# Patient Record
Sex: MALE | URBAN - METROPOLITAN AREA
[De-identification: names, ages, dates, MRNs, and addresses within clinical notes are randomized per-mention and may not be internally consistent; named-entity substitution may affect disease eponyms.]

---

## 2018-05-07 ENCOUNTER — HOSPITAL ENCOUNTER (OUTPATIENT)
Age: 58
Setting detail: SPECIMEN
Discharge: HOME | End: 2018-05-07
Payer: COMMERCIAL

## 2018-05-11 LAB — DERMATOLOGY PATHOLOGY REPORT: NORMAL

## 2024-07-08 ENCOUNTER — HOSPITAL ENCOUNTER (OUTPATIENT)
Age: 64
Setting detail: SPECIMEN
Discharge: HOME | End: 2024-07-08

## 2024-07-08 DIAGNOSIS — E53.8 VITAMIN B12 DEFICIENCY: ICD-10-CM

## 2024-07-08 DIAGNOSIS — R73.01 IFG (IMPAIRED FASTING GLUCOSE): ICD-10-CM

## 2024-07-08 DIAGNOSIS — E03.9 HYPOTHYROIDISM, UNSPECIFIED TYPE: ICD-10-CM

## 2024-07-08 DIAGNOSIS — E55.9 VITAMIN D DEFICIENCY: ICD-10-CM

## 2024-07-08 DIAGNOSIS — E78.2 MIXED HYPERLIPIDEMIA: ICD-10-CM

## 2024-07-08 DIAGNOSIS — R97.20 ABNORMAL PSA: ICD-10-CM

## 2024-07-08 LAB
25(OH)D3 SERPL-MCNC: 25 NG/ML (ref 30–100)
ALBUMIN SERPL-MCNC: 4.9 G/DL (ref 3.5–5.2)
ALBUMIN/GLOB SERPL: 2 {RATIO} (ref 1–2.5)
ALP SERPL-CCNC: 89 U/L (ref 40–129)
ALT SERPL-CCNC: 25 U/L (ref 10–50)
ANION GAP SERPL CALCULATED.3IONS-SCNC: 9 MMOL/L (ref 9–16)
AST SERPL-CCNC: 28 U/L (ref 10–50)
BASOPHILS # BLD: 0.04 K/UL (ref 0–0.2)
BASOPHILS NFR BLD: 1 % (ref 0–2)
BILIRUB SERPL-MCNC: 0.5 MG/DL (ref 0–1.2)
BUN SERPL-MCNC: 25 MG/DL (ref 8–23)
CALCIUM SERPL-MCNC: 9.7 MG/DL (ref 8.6–10.4)
CHLORIDE SERPL-SCNC: 102 MMOL/L (ref 98–107)
CHOLEST SERPL-MCNC: 156 MG/DL (ref 0–199)
CHOLESTEROL/HDL RATIO: 4
CO2 SERPL-SCNC: 30 MMOL/L (ref 20–31)
CREAT SERPL-MCNC: 1 MG/DL (ref 0.7–1.2)
EOSINOPHIL # BLD: 0.13 K/UL (ref 0–0.44)
EOSINOPHILS RELATIVE PERCENT: 2 % (ref 1–4)
ERYTHROCYTE [DISTWIDTH] IN BLOOD BY AUTOMATED COUNT: 12.4 % (ref 11.8–14.4)
GFR, ESTIMATED: 84 ML/MIN/1.73M2
GLUCOSE P FAST SERPL-MCNC: 90 MG/DL (ref 74–99)
HCT VFR BLD AUTO: 44.6 % (ref 40.7–50.3)
HDLC SERPL-MCNC: 44 MG/DL
HGB BLD-MCNC: 14.6 G/DL (ref 13–17)
IMM GRANULOCYTES # BLD AUTO: <0.03 K/UL (ref 0–0.3)
IMM GRANULOCYTES NFR BLD: 0 %
LDLC SERPL CALC-MCNC: 84 MG/DL (ref 0–100)
LYMPHOCYTES NFR BLD: 1.56 K/UL (ref 1.1–3.7)
LYMPHOCYTES RELATIVE PERCENT: 24 % (ref 24–43)
MCH RBC QN AUTO: 29.7 PG (ref 25.2–33.5)
MCHC RBC AUTO-ENTMCNC: 32.7 G/DL (ref 28.4–34.8)
MCV RBC AUTO: 90.7 FL (ref 82.6–102.9)
MONOCYTES NFR BLD: 0.67 K/UL (ref 0.1–1.2)
MONOCYTES NFR BLD: 10 % (ref 3–12)
NEUTROPHILS NFR BLD: 63 % (ref 36–65)
NEUTS SEG NFR BLD: 4.12 K/UL (ref 1.5–8.1)
NRBC BLD-RTO: 0 PER 100 WBC
PLATELET # BLD AUTO: 272 K/UL (ref 138–453)
PMV BLD AUTO: 10.3 FL (ref 8.1–13.5)
POTASSIUM SERPL-SCNC: 3.9 MMOL/L (ref 3.7–5.3)
PROT SERPL-MCNC: 7 G/DL (ref 6.6–8.7)
PSA SERPL-MCNC: 1.9 NG/ML (ref 0–4)
RBC # BLD AUTO: 4.92 M/UL (ref 4.21–5.77)
SODIUM SERPL-SCNC: 141 MMOL/L (ref 136–145)
T4 SERPL-MCNC: 5.5 UG/DL (ref 4.5–11.7)
TRIGL SERPL-MCNC: 142 MG/DL
TSH SERPL DL<=0.05 MIU/L-ACNC: 2.82 UIU/ML (ref 0.27–4.2)
VIT B12 SERPL-MCNC: 528 PG/ML (ref 232–1245)
VLDLC SERPL CALC-MCNC: 28 MG/DL
WBC OTHER # BLD: 6.5 K/UL (ref 3.5–11.3)

## 2024-08-02 ENCOUNTER — HOSPITAL ENCOUNTER (OUTPATIENT)
Dept: PHYSICAL THERAPY | Facility: CLINIC | Age: 64
Setting detail: THERAPIES SERIES
Discharge: HOME | End: 2024-08-02
Payer: COMMERCIAL

## 2024-08-02 PROCEDURE — 97161 PT EVAL LOW COMPLEX 20 MIN: CPT

## 2024-08-02 PROCEDURE — 97110 THERAPEUTIC EXERCISES: CPT

## 2024-08-02 NOTE — CONSULTS
[x] [facility]  Outpatient Rehabilitation &  Therapy  [address]   P: [phone]  F: [phone]      Physical Therapy Upper Extremity Evaluation    Date:  2024  Patient: Maciel Meier   : 1960  MRN: [id number]  Physician: Dr. Lim   Insurance: [employer] ($60 copay,  vs)  Medical Diagnosis: Left shoulder pain, unspecified chronicity (M25.512)  Numbness and tingling in left hand (R20.0,R20.2)  Rehab Codes: M62.81 , M25.612 , R29.3 , R20.2  Onset Date: referral date 24     Next 's appt: 24       Subjective:   CC/HPI: Patient is a 65 y/o male presents to PT clinic with numbness to his LUE intermittently that has been going on for 2 months. No known cause. He also reports feeling a knot in his left shoulder blade region that has been going on for about the same time. He plays tennis and reports noticing the pain during tennis intermittently. Sleeps in sidelying position, also notes numbness onset throughout the night, worse when laying on his left side. Sitting down worsens the numbness.       PMHx: [] Unremarkable [] Diabetes [x] HTN  [] Pacemaker   [] MI/Heart Problems [] Cancer [] Arthritis [] Other:              [x] Refer to full medical chart  In EPIC     Comorbidities:   [] Obesity [] Dialysis  [] N/A   [] Asthma/COPD [] Dementia [] Other:   [] Stroke [] Sleep apnea [] Other:   [] Vascular disease [] Rheumatic disease [] Other:     Tests:   [] X-Ray:   [] MRI:    [] Other:    Medications: [x] Refer to full medical record [] None [] Other:  Allergies:      [x] Refer to full medical record  [] None [] Other:    Function:  Hand Dominance  [x] Right  [] Left  Marital Status Lives with wife   Employement  with [employer]   Job status Full time    Work Activities/duties  Prolonged sitting, desk work, sometimes traveling   Recreational Activities Tennis (1-2x/week), golfing       ADL/IADL [x] Previously independent with all [x] Currently independent

## 2024-08-09 ENCOUNTER — HOSPITAL ENCOUNTER (OUTPATIENT)
Dept: PHYSICAL THERAPY | Facility: CLINIC | Age: 64
Setting detail: THERAPIES SERIES
Discharge: HOME | End: 2024-08-09
Payer: COMMERCIAL

## 2024-08-09 PROCEDURE — 97110 THERAPEUTIC EXERCISES: CPT

## 2024-08-09 PROCEDURE — 97140 MANUAL THERAPY 1/> REGIONS: CPT

## 2024-08-09 NOTE — FLOWSHEET NOTE
[] [facility]  Outpatient Rehabilitation &  Therapy  [address]  [phone]  [phone] [] [facility]  Outpatient Rehabilitation &  Therapy  [address]  P: [phone]  F: [phone] [] [facility]  Outpatient Rehabilitation &  Therapy  [address]  P: [phone]  F: [phone] [] [facility]  Outpatient Rehabilitation &  Therapy  [address]  [phone]  [phone] [x] [facility]  Outpatient Rehabilitation &  Therapy  [address]   P: [phone]  F: [phone]  [] [facility]  Outpatient Rehabilitation &  Therapy  [address]  P: [phone]  F: [phone] [] [facility]  Outpatient Rehabilitation &  Therapy  [address]  P: [phone]  F: [phone] [] [facility]  Outpatient Rehabilitation &  Therapy  [address]  P: [phone]  F: [phone] [] [facility]  Outpatient Rehabilitation &  Therapy  [address]  P: [phone]  F: [phone]  [] [facility] Outpatient Rehabilitation &  Therapy  [address]  P: [phone]  F: [phone]     Physical Therapy Daily Treatment Note    Date:  2024  Patient Name:  Maciel Meier    :  1960  MRN: [id number]  Physician: Dr. Lim                       Insurance: [employer] ($60 copay,  vs)  Medical Diagnosis: Left shoulder pain, unspecified chronicity (M25.512)  Numbness and tingling in left hand (R20.0,R20.2)  Rehab Codes: M62.81 , M25.612 , R29.3 , R20.2  Onset Date: referral date 24         Next 's appt: 24   Visit# / total visits:      Cancels/No Shows: 0    Subjective:    Pain:  [x] Yes  [] No Location:  N/A  Pain Rating: (0-10 scale) 3/10  Pain altered Tx:  [x] No  [] Yes  Action:  Comments: Patient reports

## 2024-08-16 ENCOUNTER — HOSPITAL ENCOUNTER (OUTPATIENT)
Dept: PHYSICAL THERAPY | Facility: CLINIC | Age: 64
Setting detail: THERAPIES SERIES
Discharge: HOME | End: 2024-08-16
Payer: COMMERCIAL

## 2024-08-16 PROCEDURE — 97140 MANUAL THERAPY 1/> REGIONS: CPT

## 2024-08-16 PROCEDURE — 97110 THERAPEUTIC EXERCISES: CPT

## 2024-08-16 NOTE — FLOWSHEET NOTE
[x] [facility]  Outpatient Rehabilitation &  Therapy  [address]   P: [phone]  F: [phone]      Physical Therapy Daily Treatment Note    Date:  2024  Patient Name:  Maciel Meier    :  1960  MRN: [id number]  Physician: Dr. Lim                       Insurance: [employer] ($60 copay,  vs)  Medical Diagnosis: Left shoulder pain, unspecified chronicity (M25.512)  Numbness and tingling in left hand (R20.0,R20.2)  Rehab Codes: M62.81 , M25.612 , R29.3 , R20.2  Onset Date: referral date 24         Next 's appt: 24   Visit# / total visits: 3/12     Cancels/No Shows: 0        Subjective:    Pain:  [x] Yes  [] No Location:  LUE shoulder  Pain Rating: (0-10 scale) 1/10  Pain altered Tx:  [x] No  [] Yes  Action:  Comments: Patient  with less overall pain in the shoulder, less numbness in the left hand overall. Feels some stiffness in the shoulder.       Objective:    Precautions: Standard   Exercise       LUE shoulder pain  Reps/ Time Weight/ Level Comments             Bike 10'                 Corner Pectoral Stretch  3x30\" ea    HEP   Scapular retraction       HEP   Upper Trap Stretch  2x30\"    HEP   Levator Scap Stretch  2x30\"    HEP                       Prone          Rows   2x10  3#    Ext  2x10  3#    HAB  2x10     Scaption  2x10                     Resistance Band:         Rows   2x10 Blue   HEP   Extension   2x10 Blue   HEP   ER  x20 Blue   HEP   IR   x20 Blue   HEP   HAB  x20 Blue         Somatic Dysfunctions Normal Deficit Details Treatment  Position Range of force   Thoracic    []  [x]  FRSL T4-T8   [] MET   [x] MOB   [] Manipulation [] Supine  [x] Prone  [] Seated  [] Beginning  [x] Middle  [x] End-point    Rib    []  [x]  L post rib 4-8th [] MET   [x] MOB   [] Manipulation [] Supine  [x] Prone  [] Seated  [] Beginning  [x] Middle  [x] End-point    FRS = flexed, rotated, side-bent  ERS = Extended, rotated, side-bent     NS = Neutral Spine MET = Muscle

## 2024-08-21 ENCOUNTER — HOSPITAL ENCOUNTER (OUTPATIENT)
Dept: PHYSICAL THERAPY | Facility: CLINIC | Age: 64
Setting detail: THERAPIES SERIES
Discharge: HOME | End: 2024-08-21
Payer: COMMERCIAL

## 2024-08-21 PROCEDURE — 97110 THERAPEUTIC EXERCISES: CPT

## 2024-08-21 NOTE — PROGRESS NOTES
[email or web address]  Date: 08/09/2024  Prepared by: Yi Perdue    Exercises  - Standing Shoulder Row with Anchored Resistance  - 1 x daily - 7 x weekly - 3 sets - 10 reps  - Shoulder Extension with Resistance  - 1 x daily - 7 x weekly - 3 sets - 10 reps  - Standing Shoulder External Rotation with Resistance  - 1 x daily - 7 x weekly - 3 sets - 10 reps  - Standing Shoulder Horizontal Abduction with Resistance  - 1 x daily - 7 x weekly - 3 sets - 10 reps  - Scapular Protraction at Wall  - 1 x daily - 7 x weekly - 3 sets - 10 reps  - Standing Wrist Flexion Stretch  - 1 x daily - 7 x weekly - 3 sets - 30 sec hold    Comprehension of Education:  [x] Verbalizes understanding.  [x] Demonstrates understanding.  [x] Needs review.  [] Demonstrates/verbalizes HEP/Ed previously given.       Plan: [] Continue current frequency toward long and short term goals.    [] Specific Instructions for subsequent treatments: see above    [x] Hold chart until 9/21/24.     Time In: 0731            Time Out: 0830      Electronically signed by:  Yi Perdue, PT